# Patient Record
Sex: FEMALE | Race: OTHER
[De-identification: names, ages, dates, MRNs, and addresses within clinical notes are randomized per-mention and may not be internally consistent; named-entity substitution may affect disease eponyms.]

---

## 2020-02-28 ENCOUNTER — HOSPITAL ENCOUNTER (EMERGENCY)
Dept: HOSPITAL 95 - ER | Age: 60
Discharge: LEFT BEFORE DISCHARGE | End: 2020-02-28
Payer: COMMERCIAL

## 2020-02-28 VITALS — BODY MASS INDEX: 31.15 KG/M2 | HEIGHT: 61 IN | WEIGHT: 164.99 LBS

## 2020-02-28 DIAGNOSIS — Z53.21: Primary | ICD-10-CM

## 2021-08-10 ENCOUNTER — HOSPITAL ENCOUNTER (INPATIENT)
Dept: HOSPITAL 95 - ER | Age: 61
LOS: 7 days | Discharge: HOME | DRG: 177 | End: 2021-08-17
Attending: HOSPITALIST | Admitting: HOSPITALIST
Payer: COMMERCIAL

## 2021-08-10 VITALS — BODY MASS INDEX: 32.7 KG/M2 | WEIGHT: 184.53 LBS | HEIGHT: 63 IN

## 2021-08-10 DIAGNOSIS — E87.1: ICD-10-CM

## 2021-08-10 DIAGNOSIS — J12.82: ICD-10-CM

## 2021-08-10 DIAGNOSIS — T38.0X5A: ICD-10-CM

## 2021-08-10 DIAGNOSIS — Z66: ICD-10-CM

## 2021-08-10 DIAGNOSIS — M19.90: ICD-10-CM

## 2021-08-10 DIAGNOSIS — I95.9: ICD-10-CM

## 2021-08-10 DIAGNOSIS — D72.829: ICD-10-CM

## 2021-08-10 DIAGNOSIS — R74.01: ICD-10-CM

## 2021-08-10 DIAGNOSIS — E66.01: ICD-10-CM

## 2021-08-10 DIAGNOSIS — U07.1: Primary | ICD-10-CM

## 2021-08-10 DIAGNOSIS — J96.01: ICD-10-CM

## 2021-08-10 LAB
ALBUMIN SERPL BCP-MCNC: 2.8 G/DL (ref 3.4–5)
ALBUMIN/GLOB SERPL: 0.5 {RATIO} (ref 0.8–1.8)
ALT SERPL W P-5'-P-CCNC: 34 U/L (ref 12–78)
ANION GAP SERPL CALCULATED.4IONS-SCNC: 6 MMOL/L (ref 6–16)
AST SERPL W P-5'-P-CCNC: 46 U/L (ref 12–37)
BASOPHILS # BLD AUTO: 0.02 K/MM3 (ref 0–0.23)
BASOPHILS NFR BLD AUTO: 0 % (ref 0–2)
BILIRUB SERPL-MCNC: 0.5 MG/DL (ref 0.1–1)
BUN SERPL-MCNC: 19 MG/DL (ref 8–24)
CALCIUM SERPL-MCNC: 8.7 MG/DL (ref 8.5–10.1)
CHLORIDE SERPL-SCNC: 100 MMOL/L (ref 98–108)
CO2 SERPL-SCNC: 27 MMOL/L (ref 21–32)
CREAT SERPL-MCNC: 0.71 MG/DL (ref 0.4–1)
CRP SERPL-MCNC: 16 MG/DL (ref 0–0.3)
DEPRECATED RDW RBC AUTO: 39.8 FL (ref 35.1–46.3)
EOSINOPHIL # BLD AUTO: 0.04 K/MM3 (ref 0–0.68)
EOSINOPHIL NFR BLD AUTO: 1 % (ref 0–6)
ERYTHROCYTE [DISTWIDTH] IN BLOOD BY AUTOMATED COUNT: 12.6 % (ref 11.7–14.2)
GLOBULIN SER CALC-MCNC: 5.1 G/DL (ref 2.2–4)
GLUCOSE SERPL-MCNC: 113 MG/DL (ref 70–99)
HCT VFR BLD AUTO: 44.5 % (ref 33–51)
HGB BLD-MCNC: 14.5 G/DL (ref 11.5–16)
IMM GRANULOCYTES # BLD AUTO: 0.03 K/MM3 (ref 0–0.1)
IMM GRANULOCYTES NFR BLD AUTO: 0 % (ref 0–1)
LYMPHOCYTES # BLD AUTO: 0.67 K/MM3 (ref 0.84–5.2)
LYMPHOCYTES NFR BLD AUTO: 9 % (ref 21–46)
MCHC RBC AUTO-ENTMCNC: 32.6 G/DL (ref 31.5–36.5)
MCV RBC AUTO: 86 FL (ref 80–100)
MONOCYTES # BLD AUTO: 0.2 K/MM3 (ref 0.16–1.47)
MONOCYTES NFR BLD AUTO: 3 % (ref 4–13)
NEUTROPHILS # BLD AUTO: 6.37 K/MM3 (ref 1.96–9.15)
NEUTROPHILS NFR BLD AUTO: 87 % (ref 41–73)
NRBC # BLD AUTO: 0 K/MM3 (ref 0–0.02)
NRBC BLD AUTO-RTO: 0 /100 WBC (ref 0–0.2)
PLATELET # BLD AUTO: 327 K/MM3 (ref 150–400)
POTASSIUM SERPL-SCNC: 4 MMOL/L (ref 3.5–5.5)
PROT SERPL-MCNC: 7.9 G/DL (ref 6.4–8.2)
SODIUM SERPL-SCNC: 133 MMOL/L (ref 136–145)

## 2021-08-10 PROCEDURE — 8E0ZXY6 ISOLATION: ICD-10-PCS | Performed by: EMERGENCY MEDICINE

## 2021-08-10 PROCEDURE — XW033E5 INTRODUCTION OF REMDESIVIR ANTI-INFECTIVE INTO PERIPHERAL VEIN, PERCUTANEOUS APPROACH, NEW TECHNOLOGY GROUP 5: ICD-10-PCS | Performed by: NURSE PRACTITIONER

## 2021-08-10 PROCEDURE — A9270 NON-COVERED ITEM OR SERVICE: HCPCS

## 2021-08-10 NOTE — NUR
SUMMARY
 
PT ADMITTED FROM THE ER, PT ABLE TO STAND AND TRANSFER FROM THE GURNEY TO THE
BED WITH MIN ASSIST, ORIENTED PT TO THE ROOM AND CALL SYSTEM, NO COMPLAINTS,
WILL CONTINUE TO MONITOR

## 2021-08-11 LAB
ALBUMIN SERPL BCP-MCNC: 2.3 G/DL (ref 3.4–5)
ALBUMIN/GLOB SERPL: 0.5 {RATIO} (ref 0.8–1.8)
ALT SERPL W P-5'-P-CCNC: 26 U/L (ref 12–78)
ANION GAP SERPL CALCULATED.4IONS-SCNC: 7 MMOL/L (ref 6–16)
AST SERPL W P-5'-P-CCNC: 40 U/L (ref 12–37)
BASOPHILS # BLD AUTO: 0.01 K/MM3 (ref 0–0.23)
BASOPHILS NFR BLD AUTO: 0 % (ref 0–2)
BILIRUB SERPL-MCNC: 0.4 MG/DL (ref 0.1–1)
BUN SERPL-MCNC: 14 MG/DL (ref 8–24)
CALCIUM SERPL-MCNC: 8 MG/DL (ref 8.5–10.1)
CHLORIDE SERPL-SCNC: 103 MMOL/L (ref 98–108)
CO2 SERPL-SCNC: 25 MMOL/L (ref 21–32)
CREAT SERPL-MCNC: 0.62 MG/DL (ref 0.4–1)
DEPRECATED RDW RBC AUTO: 38.8 FL (ref 35.1–46.3)
EOSINOPHIL # BLD AUTO: 0.03 K/MM3 (ref 0–0.68)
EOSINOPHIL NFR BLD AUTO: 1 % (ref 0–6)
ERYTHROCYTE [DISTWIDTH] IN BLOOD BY AUTOMATED COUNT: 12.6 % (ref 11.7–14.2)
GLOBULIN SER CALC-MCNC: 4.3 G/DL (ref 2.2–4)
GLUCOSE SERPL-MCNC: 103 MG/DL (ref 70–99)
HCT VFR BLD AUTO: 39.6 % (ref 33–51)
HGB BLD-MCNC: 13 G/DL (ref 11.5–16)
IMM GRANULOCYTES # BLD AUTO: 0.03 K/MM3 (ref 0–0.1)
IMM GRANULOCYTES NFR BLD AUTO: 1 % (ref 0–1)
LYMPHOCYTES # BLD AUTO: 0.58 K/MM3 (ref 0.84–5.2)
LYMPHOCYTES NFR BLD AUTO: 10 % (ref 21–46)
MCHC RBC AUTO-ENTMCNC: 32.8 G/DL (ref 31.5–36.5)
MCV RBC AUTO: 86 FL (ref 80–100)
MONOCYTES # BLD AUTO: 0.21 K/MM3 (ref 0.16–1.47)
MONOCYTES NFR BLD AUTO: 4 % (ref 4–13)
NEUTROPHILS # BLD AUTO: 4.75 K/MM3 (ref 1.96–9.15)
NEUTROPHILS NFR BLD AUTO: 85 % (ref 41–73)
NRBC # BLD AUTO: 0 K/MM3 (ref 0–0.02)
NRBC BLD AUTO-RTO: 0 /100 WBC (ref 0–0.2)
PLATELET # BLD AUTO: 326 K/MM3 (ref 150–400)
POTASSIUM SERPL-SCNC: 3.8 MMOL/L (ref 3.5–5.5)
PROT SERPL-MCNC: 6.6 G/DL (ref 6.4–8.2)
SODIUM SERPL-SCNC: 135 MMOL/L (ref 136–145)

## 2021-08-11 PROCEDURE — 3E0D73Z INTRODUCTION OF ANTI-INFLAMMATORY INTO MOUTH AND PHARYNX, VIA NATURAL OR ARTIFICIAL OPENING: ICD-10-PCS | Performed by: NURSE PRACTITIONER

## 2021-08-11 NOTE — NUR
SHIFT SUMMARY: LS VERY COURSE T/O MOIST COUGH, NON-PRODUCTIVE. SOB WITH
EXERTION. HACKING COUGH AT TIMES. WEAK, BUT ABLE TO GET UP TO BSC, BUT IT
WEARS HER OUT AFTER DOING SO. DENIES ANY PAIN. VS ARE STABLE, MILD FEVERS.
CHILLS. TASTE IS NORMAL. NO BM TONIGHT. VOIDING WELL. FAIR APPETITE. SATS GOOD
ON 2 LITERS. HAS BEEN SLEEPING ENTIRE SHIFT EXCEPT TO USE THE BATHROOM. WILL
CONTINUE TO MONITOR. CALL LIGHT IN REACH.

## 2021-08-11 NOTE — NUR
SHIFT...
PT REMAINS ALERT AND ORIENTED X4 AND IND IN ROOM. ON 3L O2 VIA NC AND SAT 90
AND ABOVE. PT MAKES NO C/O SOB AT THIS TIME. MILD NONPRODUCTIVE COUGH. STAFF
WILL CONT TO MONITOR.

## 2021-08-12 LAB
ALBUMIN SERPL BCP-MCNC: 2.3 G/DL (ref 3.4–5)
ANION GAP SERPL CALCULATED.4IONS-SCNC: 6 MMOL/L (ref 6–16)
BUN SERPL-MCNC: 19 MG/DL (ref 8–24)
CALCIUM SERPL-MCNC: 8.3 MG/DL (ref 8.5–10.1)
CHLORIDE SERPL-SCNC: 104 MMOL/L (ref 98–108)
CO2 SERPL-SCNC: 28 MMOL/L (ref 21–32)
CREAT SERPL-MCNC: 0.68 MG/DL (ref 0.4–1)
GLUCOSE SERPL-MCNC: 148 MG/DL (ref 70–99)
PHOSPHATE SERPL-MCNC: 3 MG/DL (ref 2.5–4.9)
POTASSIUM SERPL-SCNC: 3.7 MMOL/L (ref 3.5–5.5)
SODIUM SERPL-SCNC: 138 MMOL/L (ref 136–145)

## 2021-08-12 NOTE — NUR
SHIFT SUMMARY
 
NO ACUTE EVENTS THIS SHIFT, VSS. PT IS ALERT AND ORIENTED, COOPERATIVE WITH
CARE AND CALLS APPROPRIATELY. PT ABLE TO USE BEDSIDE COMMODE INDEPENDENTLY,
PT NOTED TO BE SELF-PRONING THROUGHOUT SHIFT. PT'S O2 SATURATION ON 5 L VIA
NASAL CANNULA WAS SUSTAINING 86-87% AT START OF SHIFT, THIS RN TITRATED O2 UP
TO 7 L AT THAT TIME TO MAINTAIN O2 SATURATION OF 91-92% . RECHECKED O2 READING
AT NOON, O2 SATURATION IN LOW TO MID 90S, TURNED O2 DOWN TO 6 L VIA NASAL
CANNULA. PT DENIED PAIN THIS SHIFT.

## 2021-08-13 LAB
ALBUMIN SERPL BCP-MCNC: 2.2 G/DL (ref 3.4–5)
ALBUMIN/GLOB SERPL: 0.5 {RATIO} (ref 0.8–1.8)
ALT SERPL W P-5'-P-CCNC: 23 U/L (ref 12–78)
ANION GAP SERPL CALCULATED.4IONS-SCNC: 6 MMOL/L (ref 6–16)
AST SERPL W P-5'-P-CCNC: 32 U/L (ref 12–37)
BILIRUB SERPL-MCNC: 0.4 MG/DL (ref 0.1–1)
BUN SERPL-MCNC: 19 MG/DL (ref 8–24)
CALCIUM SERPL-MCNC: 7.9 MG/DL (ref 8.5–10.1)
CHLORIDE SERPL-SCNC: 106 MMOL/L (ref 98–108)
CO2 SERPL-SCNC: 26 MMOL/L (ref 21–32)
CREAT SERPL-MCNC: 0.61 MG/DL (ref 0.4–1)
DEPRECATED RDW RBC AUTO: 38.9 FL (ref 35.1–46.3)
ERYTHROCYTE [DISTWIDTH] IN BLOOD BY AUTOMATED COUNT: 12.4 % (ref 11.7–14.2)
GLOBULIN SER CALC-MCNC: 4.3 G/DL (ref 2.2–4)
GLUCOSE SERPL-MCNC: 144 MG/DL (ref 70–99)
HCT VFR BLD AUTO: 41.4 % (ref 33–51)
HGB BLD-MCNC: 13.5 G/DL (ref 11.5–16)
MCHC RBC AUTO-ENTMCNC: 32.6 G/DL (ref 31.5–36.5)
MCV RBC AUTO: 87 FL (ref 80–100)
NRBC # BLD AUTO: 0 K/MM3 (ref 0–0.02)
NRBC BLD AUTO-RTO: 0 /100 WBC (ref 0–0.2)
PLATELET # BLD AUTO: 433 K/MM3 (ref 150–400)
POTASSIUM SERPL-SCNC: 3.7 MMOL/L (ref 3.5–5.5)
PROT SERPL-MCNC: 6.5 G/DL (ref 6.4–8.2)
SODIUM SERPL-SCNC: 138 MMOL/L (ref 136–145)

## 2021-08-13 NOTE — NUR
SHIFT SUMMARY: MAINTAINED OXYGEN NEEDS AT 6-7 LITERS BUT SWITCHED HER TO
OXIMIZER. DESATS WITH EXERTION TO LOW 80'S DESPITE O2. LUNG SOUNDS CRACKLES
MOSTLY IN THE BASES BUT IMPROVED FROM YETERDAY. STATES SHE FEELS LIKE SHE CAN
BREATH BETTER AS LONG SHE DOES NOT MOVE. AFTER EXERTION SHE DOES TAKE LONGER
TO RECOVER, THIS LAST TIME ALMOST 5 MINUTES. IS GOOD AT LAYING IN THE PRONE
POSITION. DECREASE IN APPETITE, DRINKS PLENTY OF WATER. NO PAIN. SLEPT WELL
T/O THE NIGHT. CALL LIGHT IS IN REACH.

## 2021-08-13 NOTE — NUR
SUMMARY
 
PT RESTING QUIETLY IN BED, WAKES EASILY, HAS BEEN PLEASANT AND COOPERATIVE
WITH CARE, PT UP INDEPENDENTLY TO THE COMMODE, DYSPNEA WITH ACTIVITY, RECOVERS
SLOWLY, PT PRONING SELF INDEPENDENTLY, O2 DECREASED TO 5L OXIMYZER, PT REPORTS
SHE FEELS SHE CAN BREATH DEEPLY EASIER, NO COMPLAINTS, VSS, WILL CONT TO
MONITOR

## 2021-08-14 NOTE — NUR
SUMMARY- PT A/O X4, STEADY ON FEET, GETS UP INDEPENDANT TO BED SIDE COMMODE.
SATS 95% 6L OXIMIZER BEGINNING OF SHIFT. DASATS TO 80% WITH ACTIVITY AND
BECOMES DYSPINC. RECOVERS APPROX 5 MINUTES, TURN UP O2  TEMP TO RECOVER. PT
TOLERATING FOOD AND FLUID. WAS ABLE TO NAP ON/OFF TODAY, FEELING FATIQUED FROM
ILLNESS AND LITTLE SLEEP. PT HAS DRY COUGH. CONT PULSE OX.

## 2021-08-14 NOTE — NUR
2039 PT LYING IN BED, REPORTS SOB W/EXERTION, ON 6.5 L VIA OXIMIZER AT 94%. NO
OTHER APPARENT SIGNS OF DISTRESS. CALL LIGHT IS IN REACH.

## 2021-08-15 NOTE — NUR
PT LYING IN BED. EYES CLOSED, APPEARS TO BE RESTING. BREATHING IS EVEN,
UNLABORED. NO APPARENT SIGNS OF DISTRESS. CALL LIGHT IS IN REACH.

## 2021-08-15 NOTE — NUR
0000 PT LYING IN BED, EYES CLOSED, APPEARS TO BE RESTING. WAKES EASILY TO
VERBAL STIMUL. DENIES NEED FOR ANYTHING AT THIS TIME. CALL LIGHT IS IN REACH.
NO APPARENT SIGNS OF DISTRESS.

## 2021-08-15 NOTE — NUR
SUMMARY- PT A/O X4, INDEPENDANT IN ROOM TO BEDSIDE COMMODE. LUNG SOUNDS WITH
CRACKLES IN THE BASES.
DYSPNIC WITH
EXERTION, CONT PULSE OX, STARTED DAY ON 6L SATTING MID 90'S, DOWN TO 5L AT
1430, 92-95% AT REST, WITH ACTIVITY DESATS TO 82-84% AND RECOVERS OVER ABOUT 5
MINUTES. FREQ STRONG COUGH, MIN PRODUCTION. COUGH MED THIS AM HELPFUL, PT
WANTS AGAIN BEFORE BEDTIME. TAKING IN LG AMOUNTS OF HOT TEA AND A GOOD WATER
DRINKER. TOLERATING A GEN DIET. STATES SHE FEELS BETTER TODAY AND HAS BETTER
ENERGY.

## 2021-08-15 NOTE — NUR
0230 PT LYING IN BED, EYES CLOSED, APPEARS TO BE RESTING. BREATHING IS EVEN,
UNLABORED. NO APPARENT SIGNS OF DISTRESS. CALL LIGHT IS IN REACH.

## 2021-08-15 NOTE — NUR
PT LYING IN BED, EYES CLOSED, APPEARS TO BE RESTING. BREATHING IS EVEN,
UNLABORED. NO APPARENT SIGNS OF DISTRESS. CALL LIGHT IS IN REACH.

## 2021-08-15 NOTE — NUR
2009 PT LYING IN BED, REPORTS SOB WITH EXERTION, ON 4.5L VIA OXIMIZER AT 96%
NO OTHER APPARENT SIGNS OF DISTRESS. CALL LIGHT IS IN REACH.

## 2021-08-16 NOTE — NUR
a+o, medicated as prescribed, 2L via nc, call light in reach, able to make
needs known, looking forward to going home tomorrow, worked on getting ox as
low as possible, report shared with noc nurse

## 2021-08-17 NOTE — NUR
Spiritual care visit conducted. I provided companionship and prayer. Patient
was evidently appreciative and was eagerly awaiting being discharged. We had a
nice time as this was my second visit with her and we enjoyed going further on
getting to know each other.

## 2021-08-17 NOTE — NUR
PT DISCHARGED WITH FRIEND TO TRANSPORT. PT HAS HOME O2 FROM [facility] AND WAS ON
5l TO GO HOME. PT INDEPENDENT IN ROOM AND WOULD GET SHORT OF BREATH WITH
AMBULATION. PT INSTRUCTED TO TAKE IT EASY AT HOME AND NOT PUSH TO HARD. PT HAD
EXTRA EDUCATIONA PRIOR TO DISCHARGE ON MEDS AND MEDICATION FAXED TO PHARMACY.
PT COLLECTED ALL PERSONAL BELONGINGS AND WAS ESCORTED OUT VIA WHEELCHAIR. NO
DISTRESS NOTED.

## 2021-08-17 NOTE — NUR
NIGHT SHIFT SUMMARY
NO ACUTE CHANGES THIS SHIFT. PT AAOX4 AND INDEPENDENT IN ROOM. ON 2L O2 VIA
NC. MAINTAINS O2 SATS >90% AT REST, DESATS TO MID 80'S WHEN GETTING UP IN
ROOM. VSS, WILL CONTINUE TO MONITOR.